# Patient Record
Sex: FEMALE | Race: WHITE
[De-identification: names, ages, dates, MRNs, and addresses within clinical notes are randomized per-mention and may not be internally consistent; named-entity substitution may affect disease eponyms.]

---

## 2019-08-04 ENCOUNTER — HOSPITAL ENCOUNTER (INPATIENT)
Dept: HOSPITAL 61 - 5 NORTH | Age: 44
LOS: 1 days | Discharge: HOME | DRG: 661 | End: 2019-08-05
Payer: COMMERCIAL

## 2019-08-04 ENCOUNTER — HOSPITAL ENCOUNTER (EMERGENCY)
Dept: HOSPITAL 63 - ER | Age: 44
Discharge: ANOTHER HOSPITAL | End: 2019-08-04
Payer: COMMERCIAL

## 2019-08-04 VITALS — SYSTOLIC BLOOD PRESSURE: 105 MMHG | DIASTOLIC BLOOD PRESSURE: 64 MMHG

## 2019-08-04 VITALS — SYSTOLIC BLOOD PRESSURE: 116 MMHG | DIASTOLIC BLOOD PRESSURE: 67 MMHG

## 2019-08-04 VITALS
SYSTOLIC BLOOD PRESSURE: 112 MMHG | DIASTOLIC BLOOD PRESSURE: 59 MMHG | DIASTOLIC BLOOD PRESSURE: 59 MMHG | SYSTOLIC BLOOD PRESSURE: 112 MMHG

## 2019-08-04 VITALS — DIASTOLIC BLOOD PRESSURE: 51 MMHG | SYSTOLIC BLOOD PRESSURE: 93 MMHG

## 2019-08-04 VITALS — SYSTOLIC BLOOD PRESSURE: 112 MMHG | DIASTOLIC BLOOD PRESSURE: 59 MMHG

## 2019-08-04 VITALS — WEIGHT: 166.01 LBS | HEIGHT: 65 IN | BODY MASS INDEX: 27.66 KG/M2

## 2019-08-04 VITALS — BODY MASS INDEX: 29.21 KG/M2 | HEIGHT: 65 IN | WEIGHT: 175.31 LBS

## 2019-08-04 VITALS — DIASTOLIC BLOOD PRESSURE: 64 MMHG | SYSTOLIC BLOOD PRESSURE: 112 MMHG

## 2019-08-04 VITALS — DIASTOLIC BLOOD PRESSURE: 60 MMHG | SYSTOLIC BLOOD PRESSURE: 104 MMHG

## 2019-08-04 VITALS — DIASTOLIC BLOOD PRESSURE: 56 MMHG | SYSTOLIC BLOOD PRESSURE: 106 MMHG

## 2019-08-04 VITALS — SYSTOLIC BLOOD PRESSURE: 106 MMHG | DIASTOLIC BLOOD PRESSURE: 75 MMHG

## 2019-08-04 VITALS — SYSTOLIC BLOOD PRESSURE: 115 MMHG | DIASTOLIC BLOOD PRESSURE: 67 MMHG

## 2019-08-04 DIAGNOSIS — Z87.442: ICD-10-CM

## 2019-08-04 DIAGNOSIS — Z88.5: ICD-10-CM

## 2019-08-04 DIAGNOSIS — N13.2: Primary | ICD-10-CM

## 2019-08-04 DIAGNOSIS — K59.09: ICD-10-CM

## 2019-08-04 DIAGNOSIS — R79.89: ICD-10-CM

## 2019-08-04 DIAGNOSIS — N13.6: Primary | ICD-10-CM

## 2019-08-04 LAB
ALBUMIN SERPL-MCNC: 3.6 G/DL (ref 3.4–5)
ALP SERPL-CCNC: 92 U/L (ref 46–116)
ALT SERPL-CCNC: 24 U/L (ref 14–59)
AMPHETAMINE/METHAMPHETAMINE: (no result)
ANION GAP SERPL CALC-SCNC: 11 MMOL/L (ref 6–14)
APTT PPP: YELLOW S
AST SERPL-CCNC: 19 U/L (ref 15–37)
BACTERIA #/AREA URNS HPF: (no result) /HPF
BARBITURATES UR-MCNC: (no result) UG/ML
BASOPHILS # BLD AUTO: 0 X10^3/UL (ref 0–0.2)
BASOPHILS NFR BLD: 1 % (ref 0–3)
BENZODIAZ UR-MCNC: (no result) UG/L
BILIRUB DIRECT SERPL-MCNC: 0.1 MG/DL (ref 0–0.2)
BILIRUB SERPL-MCNC: 0.3 MG/DL (ref 0.2–1)
BILIRUB UR QL STRIP: (no result)
CA-I SERPL ISE-MCNC: 13 MG/DL (ref 7–20)
CALCIUM SERPL-MCNC: 9.1 MG/DL (ref 8.5–10.1)
CANNABINOIDS UR-MCNC: (no result) UG/L
CHLORIDE SERPL-SCNC: 104 MMOL/L (ref 98–107)
CO2 SERPL-SCNC: 24 MMOL/L (ref 21–32)
COCAINE UR-MCNC: (no result) NG/ML
CREAT SERPL-MCNC: 1.2 MG/DL (ref 0.6–1)
EOSINOPHIL NFR BLD: 0.2 X10^3/UL (ref 0–0.7)
EOSINOPHIL NFR BLD: 3 % (ref 0–3)
ERYTHROCYTE [DISTWIDTH] IN BLOOD BY AUTOMATED COUNT: 13 % (ref 11.5–14.5)
FIBRINOGEN PPP-MCNC: (no result) MG/DL
GFR SERPLBLD BASED ON 1.73 SQ M-ARVRAT: 48.8 ML/MIN
GLUCOSE SERPL-MCNC: 119 MG/DL (ref 70–99)
GLUCOSE UR STRIP-MCNC: (no result) MG/DL
HCT VFR BLD CALC: 38.6 % (ref 36–47)
HGB BLD-MCNC: 12.8 G/DL (ref 12–15.5)
HYALINE CASTS #/AREA URNS LPF: (no result) /HPF
LIPASE: 228 U/L (ref 73–393)
LYMPHOCYTES # BLD: 1.2 X10^3/UL (ref 1–4.8)
LYMPHOCYTES NFR BLD AUTO: 22 % (ref 24–48)
MCH RBC QN AUTO: 30 PG (ref 25–35)
MCHC RBC AUTO-ENTMCNC: 33 G/DL (ref 31–37)
MCV RBC AUTO: 89 FL (ref 79–100)
METHADONE SERPL-MCNC: (no result) NG/ML
MONO #: 0.4 X10^3/UL (ref 0–1.1)
MONOCYTES NFR BLD: 8 % (ref 0–9)
NEUT #: 3.5 X10^3UL (ref 1.8–7.7)
NEUTROPHILS NFR BLD AUTO: 66 % (ref 31–73)
NITRITE UR QL STRIP: (no result)
OPIATES UR-MCNC: (no result) NG/ML
PCP SERPL-MCNC: (no result) MG/DL
PLATELET # BLD AUTO: 199 X10^3/UL (ref 140–400)
POTASSIUM SERPL-SCNC: 3.9 MMOL/L (ref 3.5–5.1)
PREG TEST PT QUAL: NEGATIVE
PROT SERPL-MCNC: 6.7 G/DL (ref 6.4–8.2)
RBC # BLD AUTO: 4.32 X10^6/UL (ref 3.5–5.4)
RBC #/AREA URNS HPF: >40 /HPF (ref 0–2)
SODIUM SERPL-SCNC: 139 MMOL/L (ref 136–145)
SP GR UR STRIP: >=1.03
SQUAMOUS #/AREA URNS LPF: (no result) /LPF
UROBILINOGEN UR-MCNC: 0.2 MG/DL
WBC # BLD AUTO: 5.3 X10^3/UL (ref 4–11)
WBC #/AREA URNS HPF: (no result) /HPF (ref 0–4)

## 2019-08-04 PROCEDURE — 84703 CHORIONIC GONADOTROPIN ASSAY: CPT

## 2019-08-04 PROCEDURE — 84702 CHORIONIC GONADOTROPIN TEST: CPT

## 2019-08-04 PROCEDURE — 85610 PROTHROMBIN TIME: CPT

## 2019-08-04 PROCEDURE — 80048 BASIC METABOLIC PNL TOTAL CA: CPT

## 2019-08-04 PROCEDURE — 80053 COMPREHEN METABOLIC PANEL: CPT

## 2019-08-04 PROCEDURE — 96365 THER/PROPH/DIAG IV INF INIT: CPT

## 2019-08-04 PROCEDURE — 36415 COLL VENOUS BLD VENIPUNCTURE: CPT

## 2019-08-04 PROCEDURE — 85025 COMPLETE CBC W/AUTO DIFF WBC: CPT

## 2019-08-04 PROCEDURE — 80307 DRUG TEST PRSMV CHEM ANLYZR: CPT

## 2019-08-04 PROCEDURE — 96372 THER/PROPH/DIAG INJ SC/IM: CPT

## 2019-08-04 PROCEDURE — BT1F1ZZ FLUOROSCOPY OF LEFT KIDNEY, URETER AND BLADDER USING LOW OSMOLAR CONTRAST: ICD-10-PCS | Performed by: UROLOGY

## 2019-08-04 PROCEDURE — 81001 URINALYSIS AUTO W/SCOPE: CPT

## 2019-08-04 PROCEDURE — 76000 FLUOROSCOPY <1 HR PHYS/QHP: CPT

## 2019-08-04 PROCEDURE — 85730 THROMBOPLASTIN TIME PARTIAL: CPT

## 2019-08-04 PROCEDURE — 99285 EMERGENCY DEPT VISIT HI MDM: CPT

## 2019-08-04 PROCEDURE — 0T778DZ DILATION OF LEFT URETER WITH INTRALUMINAL DEVICE, VIA NATURAL OR ARTIFICIAL OPENING ENDOSCOPIC: ICD-10-PCS | Performed by: UROLOGY

## 2019-08-04 PROCEDURE — A7015 AEROSOL MASK USED W NEBULIZE: HCPCS

## 2019-08-04 PROCEDURE — 83690 ASSAY OF LIPASE: CPT

## 2019-08-04 PROCEDURE — C1769 GUIDE WIRE: HCPCS

## 2019-08-04 PROCEDURE — 74176 CT ABD & PELVIS W/O CONTRAST: CPT

## 2019-08-04 PROCEDURE — 96375 TX/PRO/DX INJ NEW DRUG ADDON: CPT

## 2019-08-04 PROCEDURE — 74022 RADEX COMPL AQT ABD SERIES: CPT

## 2019-08-04 PROCEDURE — 80076 HEPATIC FUNCTION PANEL: CPT

## 2019-08-04 RX ADMIN — TAMSULOSIN HYDROCHLORIDE SCH MG: 0.4 CAPSULE ORAL at 10:00

## 2019-08-04 RX ADMIN — TAMSULOSIN HYDROCHLORIDE SCH MG: 0.4 CAPSULE ORAL at 09:53

## 2019-08-04 RX ADMIN — SULFAMETHOXAZOLE AND TRIMETHOPRIM SCH TAB: 800; 160 TABLET ORAL at 15:01

## 2019-08-04 RX ADMIN — SULFAMETHOXAZOLE AND TRIMETHOPRIM SCH TAB: 800; 160 TABLET ORAL at 23:08

## 2019-08-04 RX ADMIN — BACITRACIN SCH MLS/HR: 5000 INJECTION, POWDER, FOR SOLUTION INTRAMUSCULAR at 09:54

## 2019-08-04 RX ADMIN — BACITRACIN SCH MLS/HR: 5000 INJECTION, POWDER, FOR SOLUTION INTRAMUSCULAR at 18:19

## 2019-08-04 NOTE — OP
DATE OF SURGERY:  08/04/2019



SURGEON:  Maria Ines Walker MD



ASSISTANT:  None.



PREOPERATIVE DIAGNOSIS:  Left ureter stone.



POSTOPERATIVE DIAGNOSIS:  Left ureter stone.



PROCEDURE PERFORMED:  Cystoscopy with left ureteral stent placement and left

retrograde pyelogram.



ANESTHESIA TYPE:  General.



DESCRIPTION OF PROCEDURE:  This is a 43-year-old female admitted for a 6 mm

proximal left ureter stone causing obstruction, as well as likely urinary tract

infection.  After discussion of risks, benefits and alternatives, she agreed to

the above procedure.  



Informed consent was obtained.  The patient was taken to the operating room

where general anesthesia was induced.  She was placed in dorsal lithotomy

position, sterilely prepped and draped.  A timeout was performed.  A rigid

cystoscope was advanced through the urethra and into the bladder.  Within the

bladder was cloudy urine, which was drained.  The ureteral catheter was then

placed into the left ureteral orifice and a gentle left retrograde pyelogram was

performed, moderate left hydronephrosis was noted, and the stone in the proximal

left ureter could also be visualized on fluoroscopy.  A guidewire was passed

into the left ureter.  A 6 x 24 cm stent was then advanced over the wire and

appropriate curl was noted in the renal pelvis and in the bladder.  The bladder

contents were emptied.  The patient was awakened and taken to the recovery room

in stable condition.



BLOOD LOSS:  None.



COMPLICATIONS:  None.



SPECIMEN:  None.

 



______________________________

MARIA INES WALKER MD



DR:  ALICE/nts  JOB#:  [id number] / [id number]

DD:  08/04/2019 13:30  DT:  08/04/2019 16:30

## 2019-08-04 NOTE — PDOC2
UROLOGY CONSULT


Date of Consult


Date of Consult


DATE: 8/4/19 


TIME: 09:57





Reason for Consult


Reason for Consult:


left ureter stone





Identification/Chief Complaint


Chief Complaint


left flank pain





Source


Source:  Chart review, Patient





History of Present Illness


Reason for Visit:


42 yo female presented to [facility] ER earlier today, reporting severe left 

flank pain that radiates to LLQ. Pain was associated with nausea, emesis. No 

fevers, chills.  CT obtained, I reviewed the images, which shows 6 mm left 

proximal ureter stone adjacent to L3. The stone is visible on CT  image. 

Creatinine 1.2. She was then transferred to [facility]. Currently

still having mild nausea, pain has improved. She had a previous kidney stone 

approx 17 years ago. She reports urinary frequency, no dysuria. UA with 

bacteria.


She returned from [place] last week. 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





Past Medical History


GI:  Constipation





Family History


Family History:  No Significant





Social History


No


ALCOHOL:  none


Drugs:  None





Current Medications


Current Medications





Current Medications


Morphine Sulfate (Morphine Sulfate) 4 mg PRN Q4HRS  PRN IV PAIN;  Start 8/4/19 

at 09:30


Ondansetron HCl (Zofran) 4 mg PRN Q4HRS  PRN IV NAUSEA/VOMITING;  Start 8/4/19 

at 09:30


Sodium Chloride 1,000 ml @  100 mls/hr Q10H IV  Last administered on 8/4/19at 

09:54;  Start 8/4/19 at 09:30


Tamsulosin HCl (Flomax) 0.4 mg DAILY PO  Last administered on 8/4/19at 09:53;  

Start 8/4/19 at 10:00





Allergies


Allergies:  


Coded Allergies:  


     hydrocodone (Verified  Allergy, Intermediate, Rash, 8/4/19)


   


   and dizziness,tolerates iv morphine





ROS


Review Of Systems:


Except as noted in HPI:


CONSTITUTIONAL:        No fever or chills


EYES:                          No recent changes


SKIN:               No rash or itching


CARDIOVASCULAR:     No chest pain, syncope, palpitations, or edema


RESPIRATORY:            No SOB or cough


GASTROINTESTINAL:    + nausea, vomiting


NEUROLOGICAL:          No headaches or weakness


ENDOCRINE:               No cold or heat intolerance


GENITOURINARY:         + frequency of urination


MUSCULOSKELETAL:   No back pain or joint pain


LYMPHATICS:               No enlarged lymph nodes


PSYCHIATRIC:              No anxiety or depression





Physical Exam


Physical Exam:


General: Pleasant, no acute distress, well groomed


Eyes: conjunctiva anicteric, eyes full range of motion


ENT: moist oral mucosa, normal dentition


Neck: Trachea midline, no masses


Respiratory: unlabored breathing, not using accessory muscles, no crackles or 

wheezes


Cardiovascular: Regular rate and rhythm, no peripheral edema


Abdomen: nontender, nondistended, no hepatosplenomegaly, no masses


Back: no CVA tenderness


Skin: no rashes or skin lesions on visualized skin


Psych: normal mood, affect. Alert and oriented x 3.





Vitals


VITALS





Vital Signs








  Date Time  Temp Pulse Resp B/P (MAP) Pulse Ox O2 Delivery O2 Flow Rate FiO2


 


8/4/19 09:39      Room Air  











Assessment/Plan


Assessment/Plan


Left proximal ureter with possible UTI. Discussed option of pain management + 

UTI treatment, then stone treatment after UTI treated. Also discussed option of 

ureteral stent placement today, then definitive stone management as outpatient 

([facility]). Risks of procedure discussed including bleeding, infection, pain, need 

for additional procedures, anesthesia risk.


She wants to proceed with ureteral stent placement - wants to try to get to work

tomorrow. Should be ok for her to discharge later today after the procedure.





Start Bactrim DS BID for UTI, continue for 7 days total.











MARIA INES WALKER MD                Aug 4, 2019 09:58

## 2019-08-04 NOTE — RAD
Acute Abdominal Series: 8/4/2019 2:43 AM

 

Reason for study: Left flank pain.

 

Comparison studies: CT abdomen/pelvis August 4, 2019.

 

Technique: Frontal view of the chest was obtained along with supine and 

upright views of the abdomen.

 

Findings: 

 

Nonobstructive bowel gas pattern. No air fluid levels or free air. 

 

The lungs are clear without acute consolidative opacity. No pleural 

effusion or pneumothorax. The cardiac and mediastinal contours are normal.

 

Suspect a left ureteral calculus measuring 4 mm at the level of the left 

L3 transverse process.

 

Visualized osseous structures are intact.

 

IMPRESSION:

 

1. 4 mm calculus is identified along the proximal left ureter at the level

of the left L3 transverse process. Findings likely correspond with 

calculus identified on CT.

2. No acute cardiopulmonary process. Nonobstructive bowel gas pattern.

 

Electronically signed by: Samaria Collazo MD (8/4/2019 7:56 AM) 

[facility]

## 2019-08-04 NOTE — ED.ADGEN
Past History


Past Medical History:  Constipation, Kidney Stones


Past Surgical History:  Other


Past Surgical History


Bladder Lift





Adult General


Chief Complaint


Chief Complaint


".. I ve been hurting really bad tonight.. here on the Lt.. It was in my back...

but now more low towards the front... It somewhat like a kidney stone pain.. I 

ve had the twice before... but it been a long time ago... I feel constipated.. 

but I ve had a stool.. .. I  may be dehydrated.. I just got back from [facility]..and the [facility].... my father wanted to take some pictures of 

birds..."





HPI


HPI





Patient is a 43 year old female who presents with above hx and complaints low 

back pain on Lt. flank.  Patient states pain is somewhat similar to prior kidney

stones. Patient has had 2 prior kidney stones. Patient does feel she dehydrated 

when she was in [place]. Pt. denies any intake of bad food..Pt was in

[facility] and [facility] during her stay .   Patient denies any dysuria. Patient 

is complaining of severe nausea. No history of trauma. No history 

immunosuppression. No specific ill contacts. Has had a normal stool.   Pain has 

been intermittent until this morning.  Patient normally follows with Dr. Solis





Review of Systems


Review of Systems





Constitutional: Denies fever or chills []


Eyes: Denies change in visual acuity, redness, or eye pain []


HENT: Denies nasal congestion or sore throat []


Respiratory: Denies cough or shortness of breath []


Cardiovascular: No additional information not addressed in HPI []


GI: Complaints of left flank abdominal pain, nausea. Denies, vomiting, bloody 

stools or diarrhea []


: Denies dysuria or hematuria []


Musculoskeletal: Left flank back pain. No midline tenderness


Integument: Denies rash or skin lesions []


Neurologic: Denies headache, focal weakness or sensory changes []


Endocrine: Denies polyuria or polydipsia []





All other systems were reviewed and found to be within normal limits, except as 

documented in this note.





Family History


Family History


Noncontributory





Current Medications


Current Medications





Current Medications








 Medications


  (Trade)  Dose


 Ordered  Sig/Damian  Start Time


 Stop Time Status Last Admin


Dose Admin


 


 Diphenhydramine


 HCl


  (Benadryl)  25 mg  1X  ONCE  19 07:15


 19 07:16 DC 19 07:26


25 MG


 


 Famotidine


  (Pepcid Vial)  20 mg  1X  ONCE  19 03:00


 19 03:01 DC 19 03:16


20 MG


 


 Ketorolac


 Tromethamine


  (Toradol 30mg


 Vial)  30 mg  1X  ONCE  19 05:00


 19 05:01 DC 19 05:39


30 MG


 


 Lactated Ringer's  1,000 ml @ 


 1,000 mls/hr  1X  ONCE  19 04:30


 19 05:29 DC 19 05:11


1,000 MLS/HR


 


 Levofloxacin


  (Levaquin)  500 mg  STK-MED ONCE  19 06:47


 19 06:48 DC  





 


 Levofloxacin/


 Dextrose  100 ml @ 


 100 mls/hr  1X  ONCE  19 07:30


 19 07:59 DC 19 07:25


100 MLS/HR


 


 Morphine Sulfate


  (Morphine 10mg


 Syringe)  10 mg  STK-MED ONCE  19 03:05


 19 03:06 DC  





 


 Morphine Sulfate


  (Morphine 2mg


 Syringe)  2 mg  1X  ONCE  19 03:30


 19 03:45 DC 19 03:28


2 MG


 


 Ondansetron HCl


  (Zofran)  8 mg  1X  ONCE  19 03:00


 19 03:01 DC 19 03:17


8 MG


 


 Prochlorperazine


 Edisylate


  (Compazine)  10 mg  1X  ONCE  19 07:15


 19 07:16 DC 19 07:26


10 MG











Allergies


Allergies





Allergies








Coded Allergies Type Severity Reaction Last Updated Verified


 


  hydrocodone Allergy Severe Rash 19 Yes











Physical Exam


Physical Exam





Constitutional: Well developed, well nourished, in acute distress, non-toxic 

appearance. []


HENT: Normocephalic, atraumatic, bilateral external ears normal, oropharynx 

moist, no oral exudates, nose normal. []


Eyes: PERRLA, EOMI, conjunctiva normal, no discharge. [] 


Neck: Normal range of motion, no tenderness, supple, no stridor. [] 


Cardiovascular: Tachycardia Heart rate regular rhythm, no murmur []


Lungs & Thorax:  Bilateral breath sounds equal at apexes on to auscultation []


Abdomen: Bowel sounds decreased, soft, left flank tenderness, no masses, no 

pulsatile masses. [] Pain radiates into the groin area on percussion on the 

left.


Skin: Warm,  diaphoretic, no erythema, no rash. [] 


Back: No tenderness, left CVA tenderness. [] 


Extremities: No tenderness, no cyanosis, no clubbing, ROM intact, no edema. [] 

Straight leg lift negative


Neurologic: Alert and oriented X 3, normal motor function, normal sensory 

function, no focal deficits noted. []


Psychologic: Affect anxious, judgement normal, mood normal. Appears  intelligent

 and understands medical issues.





Current Patient Data


Vital Signs





                                   Vital Signs








  Date Time  Temp Pulse Resp B/P (MAP) Pulse Ox O2 Delivery O2 Flow Rate FiO2


 


19 07:00  66 20 112/59 (76) 96 Room Air  


 


19 03:02 98.0       








Lab Results





                                Laboratory Tests








Test


 19


02:51 19


06:30


 


White Blood Count


 5.3 x10^3/uL


(4.0-11.0) 





 


Red Blood Count


 4.32 x10^6/uL


(3.50-5.40) 





 


Hemoglobin


 12.8 g/dL


(12.0-15.5) 





 


Hematocrit


 38.6 %


(36.0-47.0) 





 


Mean Corpuscular Volume


 89 fL ()


 





 


Mean Corpuscular Hemoglobin 30 pg (25-35)   


 


Mean Corpuscular Hemoglobin


Concent 33 g/dL


(31-37) 





 


Red Cell Distribution Width


 13.0 %


(11.5-14.5) 





 


Platelet Count


 199 x10^3/uL


(140-400) 





 


Neutrophils (%) (Auto) 66 % (31-73)   


 


Lymphocytes (%) (Auto) 22 % (24-48)  L 


 


Monocytes (%) (Auto) 8 % (0-9)   


 


Eosinophils (%) (Auto) 3 % (0-3)   


 


Basophils (%) (Auto) 1 % (0-3)   


 


Neutrophils # (Auto)


 3.5 x10^3uL


(1.8-7.7) 





 


Lymphocytes # (Auto)


 1.2 x10^3/uL


(1.0-4.8) 





 


Monocytes # (Auto)


 0.4 x10^3/uL


(0.0-1.1) 





 


Eosinophils # (Auto)


 0.2 x10^3/uL


(0.0-0.7) 





 


Basophils # (Auto)


 0.0 x10^3/uL


(0.0-0.2) 





 


Prothrombin Time


 9.4 SEC


(9.4-11.4) 





 


Prothrombin Time INR 0.9 (0.9-1.1)   


 


PTT


 23 SEC (23-33)


 





 


Maternal Serum HCG Beta


Subunit < 1 mIU/mL


(0-6) 





 


Sodium Level


 139 mmol/L


(136-145) 





 


Potassium Level


 3.9 mmol/L


(3.5-5.1) 





 


Chloride Level


 104 mmol/L


() 





 


Carbon Dioxide Level


 24 mmol/L


(21-32) 





 


Anion Gap 11 (6-14)   


 


Blood Urea Nitrogen


 13 mg/dL


(7-20) 





 


Creatinine


 1.2 mg/dL


(0.6-1.0)  H 





 


Estimated GFR


(Cockcroft-Gault) 48.8  


 





 


Glucose Level


 119 mg/dL


(70-99)  H 





 


Calcium Level


 9.1 mg/dL


(8.5-10.1) 





 


Total Bilirubin


 0.3 mg/dL


(0.2-1.0) 





 


Direct Bilirubin


 0.1 mg/dL


(0.0-0.2) 





 


Aspartate Amino Transferase


(AST) 19 U/L (15-37)


 





 


Alanine Aminotransferase (ALT)


 24 U/L (14-59)


 





 


Alkaline Phosphatase


 92 U/L


() 





 


Total Protein


 6.7 g/dL


(6.4-8.2) 





 


Albumin


 3.6 g/dL


(3.4-5.0) 





 


Lipase


 228 U/L


() 





 


Serum Pregnancy Test,


Qualitative Negative (NEG)


 





 


Urine Collection Type  Void  


 


Urine Color  Yellow  


 


Urine Clarity  Hazy  


 


Urine pH  5.0  


 


Urine Specific Gravity  >=1.030  


 


Urine Protein


 


 Trace


(NEG-TRACE)


 


Urine Glucose (UA)


 


 Neg mg/dL


(NEG)


 


Urine Ketones (Stick)


 


 Neg mg/dL


(NEG)


 


Urine Blood  Large (NEG)  


 


Urine Nitrite  Neg (NEG)  


 


Urine Bilirubin  Neg (NEG)  


 


Urine Urobilinogen Dipstick


 


 0.2 mg/dL (0.2


mg/dL)


 


Urine Leukocyte Esterase  Neg (NEG)  


 


Urine RBC


 


 >40 /HPF (0-2)





 


Urine WBC


 


 1-4 /HPF (0-4)





 


Urine Squamous Epithelial


Cells 


 Few /LPF  





 


Urine Bacteria


 


 Few /HPF


(0-FEW)


 


Urine Hyaline Casts  Few /HPF  


 


Urine Mucus  Mod /LPF  


 


Urine Opiates Screen  Pos (NEG)  


 


Urine Methadone Screen  Neg (NEG)  


 


Urine Barbiturates  Neg (NEG)  


 


Urine Phencyclidine Screen  Neg (NEG)  


 


Urine


Amphetamine/Methamphetamine 


 Neg (NEG)  





 


Urine Benzodiazepines Screen  Neg (NEG)  


 


Urine Cocaine Screen  Neg (NEG)  


 


Urine Cannabinoids Screen  Neg (NEG)  


 


Urine Ethyl Alcohol  Neg (NEG)  











EKG


EKG


[]





Radiology/Procedures


Radiology/Procedures


[][facility] [address], [place], [place] [address] [phone]


                                        


                                 IMAGING REPORT





                                     Signed





PATIENT: MATT PRATHER   ACCOUNT: [id number]     MRN#: [id number]


: 1975           LOCATION: ER              AGE: 43


SEX: F                    EXAM DT: 19         ACCESSION#: [id number]


STATUS: REG ER            ORD. PHYSICIAN: KELLI GARCIA MD   


REASON: Left flank pain, hx utereus suspension, kidney stones


PROCEDURE: CT ABDOMEN PELVIS WO CONTRAST





INDICATION: Left flank pain


 


COMPARISON: None.


 


TECHNIQUE:


 


Axial CT images obtained through the abdomen and pelvis without contrast. 


Limited assessment of solid organ structures and vasculature secondary to 


lack of intravenous contrast..


 


One or more of the following individualized dose reduction techniques were


utilized for this examination:  1. Automated exposure control;  2. 


Adjustment of the mA and/or kV according to patient size;  3. Use of 


iterative reconstruction technique.


 


FINDINGS:


 


Abdominal aorta is not aneurysmal.


No intrahepatic bile duct dilation.


 Limited evaluation of the pancreas secondary to lack of contrast.


Spleen unremarkable.


Left-sided hydronephrosis with perinephric edema and proximal hydroureter.


5-6 mm left proximal ureter stone.


Urinary bladder is partially distended.


Nonobstructive right renal stone without hydronephrosis. Suspected 


low-density lesion along the right renal cortex which may be cystic in 


nature but limited evaluation on noncontrast imaging.


No periappendiceal inflammatory changes.


No dilated loops of bowel to suggest obstruction.


Degenerative changes the spine.


Heterogeneity within the uterus with some low-density lesion suspected.


Degenerative changes the spine with some disc protrusions including at 


L5-S1 and L4-5.


 


IMPRESSION:


 


1.  Left-sided hydronephrosis and hydroureter with a proximal ureter 


stone.


 


2.  Suspected low-density lesions within the uterus. Most common cause 


would be fibroids.


 


3.  Degenerative changes the spine with some disc protrusions identified.


 


Electronically signed by: Shaq Prather MD (2019 5:51 AM) [facility]














DICTATED AND SIGNED BY:     SHAQ PRATHER MD


DATE:     19 0551





CC: MILAN SOLIS MD; KELLI GARCIA MD ~





Course & Med Decision Making


Course & Med Decision Making


Pertinent Labs and Imaging studies reviewed. (See chart for details)





Discussed presentation, testing and treatment plan with  and Pt.  in for 

transfer to [facility] for further evaluation and treatment and 

urology consult





[]





Final Impression


Final Impression


1. Renal Colic - Proximal Lt stone with hydronephrosis


2. Mild elevation in creatinine 1.2





Dragon Disclaimer


Dragon Disclaimer


This electronic medical record was generated, in whole or in part, using a voice

 recognition dictation system.





Discharge Summary


Visit Information


Final Diagnosis


Problems


Medical Problems:


(1) Flank pain


Status: Acute  





(2) Hydronephrosis


Status: Acute  





(3) Renal colic on left side


Status: Acute  











Brief Hospital Course


Allergies





                                    Allergies








Coded Allergies Type Severity Reaction Last Updated Verified


 


  hydrocodone Allergy Severe Rash 19 Yes








Vital Signs





Vital Signs








  Date Time  Temp Pulse Resp B/P (MAP) Pulse Ox O2 Delivery O2 Flow Rate FiO2


 


19 07:00  66 20 112/59 (76) 96 Room Air  


 


19 03:02 98.0       








Lab Results





Laboratory Tests








Test


 19


02:51 19


06:30


 


White Blood Count


 5.3 x10^3/uL


(4.0-11.0) 





 


Red Blood Count


 4.32 x10^6/uL


(3.50-5.40) 





 


Hemoglobin


 12.8 g/dL


(12.0-15.5) 





 


Hematocrit


 38.6 %


(36.0-47.0) 





 


Mean Corpuscular Volume 89 fL ()  


 


Mean Corpuscular Hemoglobin 30 pg (25-35)  


 


Mean Corpuscular Hemoglobin


Concent 33 g/dL


(31-37) 





 


Red Cell Distribution Width


 13.0 %


(11.5-14.5) 





 


Platelet Count


 199 x10^3/uL


(140-400) 





 


Neutrophils (%) (Auto) 66 % (31-73)  


 


Lymphocytes (%) (Auto) 22 % (24-48)  


 


Monocytes (%) (Auto) 8 % (0-9)  


 


Eosinophils (%) (Auto) 3 % (0-3)  


 


Basophils (%) (Auto) 1 % (0-3)  


 


Neutrophils # (Auto)


 3.5 x10^3uL


(1.8-7.7) 





 


Lymphocytes # (Auto)


 1.2 x10^3/uL


(1.0-4.8) 





 


Monocytes # (Auto)


 0.4 x10^3/uL


(0.0-1.1) 





 


Eosinophils # (Auto)


 0.2 x10^3/uL


(0.0-0.7) 





 


Basophils # (Auto)


 0.0 x10^3/uL


(0.0-0.2) 





 


Prothrombin Time


 9.4 SEC


(9.4-11.4) 





 


Prothromb Time International


Ratio 0.9 (0.9-1.1) 


 





 


Activated Partial


Thromboplast Time 23 SEC (23-33) 


 





 


Maternal Serum HCG Beta


Subunit < 1 mIU/mL


(0-6) 





 


Sodium Level


 139 mmol/L


(136-145) 





 


Potassium Level


 3.9 mmol/L


(3.5-5.1) 





 


Chloride Level


 104 mmol/L


() 





 


Carbon Dioxide Level


 24 mmol/L


(21-32) 





 


Anion Gap 11 (6-14)  


 


Blood Urea Nitrogen


 13 mg/dL


(7-20) 





 


Creatinine


 1.2 mg/dL


(0.6-1.0) 





 


Estimated GFR


(Cockcroft-Gault) 48.8 


 





 


Glucose Level


 119 mg/dL


(70-99) 





 


Calcium Level


 9.1 mg/dL


(8.5-10.1) 





 


Total Bilirubin


 0.3 mg/dL


(0.2-1.0) 





 


Direct Bilirubin


 0.1 mg/dL


(0.0-0.2) 





 


Aspartate Amino Transf


(AST/SGOT) 19 U/L (15-37) 


 





 


Alanine Aminotransferase


(ALT/SGPT) 24 U/L (14-59) 


 





 


Alkaline Phosphatase


 92 U/L


() 





 


Total Protein


 6.7 g/dL


(6.4-8.2) 





 


Albumin


 3.6 g/dL


(3.4-5.0) 





 


Lipase


 228 U/L


() 





 


Serum Pregnancy Test,


Qualitative Negative (NEG) 


 





 


Urine Collection Type  Void 


 


Urine Color  Yellow 


 


Urine Clarity  Hazy 


 


Urine pH  5.0 


 


Urine Specific Gravity  >=1.030 


 


Urine Protein


 


 Trace


(NEG-TRACE)


 


Urine Glucose (UA)


 


 Neg mg/dL


(NEG)


 


Urine Ketones (Stick)


 


 Neg mg/dL


(NEG)


 


Urine Blood  Large (NEG) 


 


Urine Nitrite  Neg (NEG) 


 


Urine Bilirubin  Neg (NEG) 


 


Urine Urobilinogen Dipstick


 


 0.2 mg/dL (0.2


mg/dL)


 


Urine Leukocyte Esterase  Neg (NEG) 


 


Urine RBC  >40 /HPF (0-2) 


 


Urine WBC  1-4 /HPF (0-4) 


 


Urine Squamous Epithelial


Cells 


 Few /LPF 





 


Urine Bacteria


 


 Few /HPF


(0-FEW)


 


Urine Hyaline Casts  Few /HPF 


 


Urine Mucus  Mod /LPF 


 


Urine Opiates Screen  Pos (NEG) 


 


Urine Methadone Screen  Neg (NEG) 


 


Urine Barbiturates  Neg (NEG) 


 


Urine Phencyclidine Screen  Neg (NEG) 


 


Urine


Amphetamine/Methamphetamine 


 Neg (NEG) 





 


Urine Benzodiazepines Screen  Neg (NEG) 


 


Urine Cocaine Screen  Neg (NEG) 


 


Urine Cannabinoids Screen  Neg (NEG) 


 


Urine Ethyl Alcohol  Neg (NEG) 








Brief Hospital Course


Ms. Prather  is a 43 old female who presented with Lt renal colic, stone and 

hydronephrosis.  Transfer to [facility] Dr. Guzman and urology consult.





Discharge Information


Condition at Discharge:  Stable


Disposition/Orders:  D/C to Another Facility


Dischare Medications





Current Medications


Lactated Ringer's 1,000 ml @  1,000 mls/hr Q1H IV  Last administered on 19at

 02:56; Admin Dose 1,000 MLS/HR;  Start 19 at 02:43;  Stop 19 at 03:42; 

 Status DC


Ondansetron HCl (Zofran) 8 mg 1X  ONCE IV  Last administered on 19at 03:17; 

Admin Dose 8 MG;  Start 19 at 03:00;  Stop 19 at 03:01;  Status DC


Famotidine (Pepcid Vial) 20 mg 1X  ONCE IVP  Last administered on 19at 

03:16; Admin Dose 20 MG;  Start 19 at 03:00;  Stop 19 at 03:01;  Status 

DC


Morphine Sulfate (Morphine 10mg Syringe) 10 mg 1X  ONCE SQ  Last administered on

 19at 03:16; Admin Dose 10 MG;  Start 19 at 03:00;  Stop 19 at 

03:05;  Status DC


Morphine Sulfate (Morphine 10mg Syringe) 10 mg STK-MED ONCE .ROUTE ;  Start 

19 at 03:05;  Stop 19 at 03:06;  Status DC


Morphine Sulfate (Morphine 2mg Syringe) 2 mg STK-MED ONCE .ROUTE ;  Start 19

 at 03:18;  Stop 19 at 03:19;  Status DC


Morphine Sulfate (Morphine 2mg Syringe) 2 mg 1X  ONCE IV  Last administered on 

19at 03:28; Admin Dose 2 MG;  Start 19 at 03:30;  Stop 19 at 03:45; 

 Status DC


Lactated Ringer's 1,000 ml @  1,000 mls/hr 1X  ONCE IV  Last administered on 

19at 05:11; Admin Dose 1,000 MLS/HR;  Start 19 at 04:30;  Stop 19 at

 05:29;  Status DC


Ketorolac Tromethamine (Toradol 30mg Vial) 30 mg 1X  ONCE IV  Last administered 

on 19at 05:39; Admin Dose 30 MG;  Start 19 at 05:00;  Stop 19 at 

05:01;  Status DC


Levofloxacin (Levaquin) 500 mg 1X  ONCE PO  Last administered on 19at 06:49;

 Admin Dose 500 MG;  Start 19 at 07:00;  Stop 19 at 07:23;  Status DC


Levofloxacin (Levaquin) 500 mg STK-MED ONCE .ROUTE ;  Start 19 at 06:47;  

Stop 19 at 06:48;  Status DC


Diphenhydramine HCl (Benadryl) 25 mg 1X  ONCE IVP  Last administered on 19at

 07:26; Admin Dose 25 MG;  Start 19 at 07:15;  Stop 19 at 07:16;  Status

 DC


Prochlorperazine Edisylate (Compazine) 10 mg 1X  ONCE IV  Last administered on 

19at 07:26; Admin Dose 10 MG;  Start 19 at 07:15;  Stop 19 at 07:16;

  Status DC


Levofloxacin/ Dextrose 100 ml @  100 mls/hr 1X  ONCE IV  Last administered on 

19at 07:25; Admin Dose 100 MLS/HR;  Start 19 at 07:30;  Stop 19 at 

07:59;  Status DC








Dragon Disclaimer


This chart was dictated in whole or in part using Voice Recognition software in 

a busy, high-work load, and often noisy Emergency Department environment.  It 

may contain unintended and wholly unrecognized errors or omissions.











KELLI GARCIA MD            Aug 4, 2019 02:24

## 2019-08-04 NOTE — PDOC4
OPERATIVE NOTE


Date:


Date:  Aug 4, 2019





Pre-Op Diagnosis:


left ureter stone





Post-Op Diagnosis:


same





Procedure Performed:


cystoscopy, left ureter stent placement, left retrograde pyelogram





Surgeon:


Maria Ines Walker MD





Anesthesia Type:


general





Blood Loss:


0





Specimans Obtained:


none





Findings:


cloudy urine in bladder.


left proximal ureter stone visible on flouroscopy.


moderate left hydronephrosis





Complications:


none





Operative Note:


see dictation.


ok for discharge from urology perspective.


continue Bactrim x 7 days


We will contact patient for followup.











MARIA INES WALKER MD                Aug 4, 2019 13:27

## 2019-08-04 NOTE — RAD
INDICATION: Left flank pain

 

COMPARISON: None.

 

TECHNIQUE:

 

Axial CT images obtained through the abdomen and pelvis without contrast. 

Limited assessment of solid organ structures and vasculature secondary to 

lack of intravenous contrast..

 

One or more of the following individualized dose reduction techniques were

utilized for this examination:  1. Automated exposure control;  2. 

Adjustment of the mA and/or kV according to patient size;  3. Use of 

iterative reconstruction technique.

 

FINDINGS:

 

Abdominal aorta is not aneurysmal.

No intrahepatic bile duct dilation.

 Limited evaluation of the pancreas secondary to lack of contrast.

Spleen unremarkable.

Left-sided hydronephrosis with perinephric edema and proximal hydroureter.

5-6 mm left proximal ureter stone.

Urinary bladder is partially distended.

Nonobstructive right renal stone without hydronephrosis. Suspected 

low-density lesion along the right renal cortex which may be cystic in 

nature but limited evaluation on noncontrast imaging.

No periappendiceal inflammatory changes.

No dilated loops of bowel to suggest obstruction.

Degenerative changes the spine.

Heterogeneity within the uterus with some low-density lesion suspected.

Degenerative changes the spine with some disc protrusions including at 

L5-S1 and L4-5.

 

IMPRESSION:

 

1.  Left-sided hydronephrosis and hydroureter with a proximal ureter 

stone.

 

2.  Suspected low-density lesions within the uterus. Most common cause 

would be fibroids.

 

3.  Degenerative changes the spine with some disc protrusions identified.

 

Electronically signed by: Akash Prather MD (8/4/2019 5:51 AM) [facility]

## 2019-08-05 VITALS — SYSTOLIC BLOOD PRESSURE: 116 MMHG | DIASTOLIC BLOOD PRESSURE: 67 MMHG

## 2019-08-05 VITALS — SYSTOLIC BLOOD PRESSURE: 100 MMHG | DIASTOLIC BLOOD PRESSURE: 56 MMHG

## 2019-08-05 LAB
ALBUMIN SERPL-MCNC: 2.6 G/DL (ref 3.4–5)
ALBUMIN/GLOB SERPL: 0.8 {RATIO} (ref 1–1.7)
ALP SERPL-CCNC: 74 U/L (ref 46–116)
ALT SERPL-CCNC: 18 U/L (ref 14–59)
ANION GAP SERPL CALC-SCNC: 13 MMOL/L (ref 6–14)
AST SERPL-CCNC: 13 U/L (ref 15–37)
BASOPHILS # BLD AUTO: 0 X10^3/UL (ref 0–0.2)
BASOPHILS NFR BLD: 0 % (ref 0–3)
BILIRUB SERPL-MCNC: 0.4 MG/DL (ref 0.2–1)
BUN SERPL-MCNC: 9 MG/DL (ref 7–20)
BUN/CREAT SERPL: 9 (ref 6–20)
CALCIUM SERPL-MCNC: 8.2 MG/DL (ref 8.5–10.1)
CHLORIDE SERPL-SCNC: 109 MMOL/L (ref 98–107)
CO2 SERPL-SCNC: 20 MMOL/L (ref 21–32)
CREAT SERPL-MCNC: 1 MG/DL (ref 0.6–1)
EOSINOPHIL NFR BLD: 0 % (ref 0–3)
EOSINOPHIL NFR BLD: 0 X10^3/UL (ref 0–0.7)
ERYTHROCYTE [DISTWIDTH] IN BLOOD BY AUTOMATED COUNT: 13.2 % (ref 11.5–14.5)
GFR SERPLBLD BASED ON 1.73 SQ M-ARVRAT: 60.2 ML/MIN
GLOBULIN SER-MCNC: 3.2 G/DL (ref 2.2–3.8)
GLUCOSE SERPL-MCNC: 102 MG/DL (ref 70–99)
HCT VFR BLD CALC: 34.4 % (ref 36–47)
HGB BLD-MCNC: 11.7 G/DL (ref 12–15.5)
LYMPHOCYTES # BLD: 0.9 X10^3/UL (ref 1–4.8)
LYMPHOCYTES NFR BLD AUTO: 14 % (ref 24–48)
MCH RBC QN AUTO: 30 PG (ref 25–35)
MCHC RBC AUTO-ENTMCNC: 34 G/DL (ref 31–37)
MCV RBC AUTO: 88 FL (ref 79–100)
MONO #: 0.4 X10^3/UL (ref 0–1.1)
MONOCYTES NFR BLD: 6 % (ref 0–9)
NEUT #: 5.4 X10^3/UL (ref 1.8–7.7)
NEUTROPHILS NFR BLD AUTO: 81 % (ref 31–73)
PLATELET # BLD AUTO: 160 X10^3/UL (ref 140–400)
POTASSIUM SERPL-SCNC: 4.3 MMOL/L (ref 3.5–5.1)
PROT SERPL-MCNC: 5.8 G/DL (ref 6.4–8.2)
RBC # BLD AUTO: 3.89 X10^6/UL (ref 3.5–5.4)
SODIUM SERPL-SCNC: 142 MMOL/L (ref 136–145)
WBC # BLD AUTO: 6.7 X10^3/UL (ref 4–11)

## 2019-08-05 RX ADMIN — BACITRACIN SCH MLS/HR: 5000 INJECTION, POWDER, FOR SOLUTION INTRAMUSCULAR at 04:14

## 2019-08-05 RX ADMIN — TAMSULOSIN HYDROCHLORIDE SCH MG: 0.4 CAPSULE ORAL at 09:00

## 2019-08-05 RX ADMIN — SULFAMETHOXAZOLE AND TRIMETHOPRIM SCH TAB: 800; 160 TABLET ORAL at 09:55

## 2019-08-05 NOTE — HP
ADMIT DATE:  08/04/2019



HISTORY OF PRESENT ILLNESS:  The patient is a 43-year-old  female

patient who presented to the Emergency Room of [facility] complaining

of pain on her left flank and back area that feels like her kidney stones pain

and she apparently had a total of 2 stones before that she passed spontaneously,

it has been a long time ago.  She apparently was on a vacation at [facility] the

[facility].  She apparently has 2 prior kidney stones that she passed

spontaneously.  She does feel that she is hydrated and she was in [place], but denies any intake or bad food.  She denied any dysuria.  She is

complaining of severe nausea, but no history of trauma, no history of

immunosuppression or specific ill contact.  She was evaluated in the Emergency

Room and has had lab work, which was essentially unremarkable.  Her urinalysis

showed that there is large amount of blood and her tox screen was positive for

opiates.  Her CT scan of the abdomen and pelvis showed that she has 4 mm

calculus identified along the proximal left ureter at the level of the left L3

transverse process finding likely corresponds with calculus identified in the CT

scan, no acute cardiopulmonary process and therefore, she was transferred to

[facility] to consult the urologist as she has left-sided

hydronephrosis with perinephric edema and proximal hydroureter 5-6 mm left

proximal ureteral stone.



PAST MEDICAL HISTORY:  Significant for 2 episodes of renal calculi that she

passed atraumatically spontaneously.  She has also had chronic constipation.



PAST SURGICAL HISTORY:  Significant also for constipation.



FAMILY HISTORY:  Unremarkable.



SOCIAL HISTORY:  She is .  She does not smoke, drink alcohol or use any

recreational drugs.  She is a teacher.  She has 3 children, 2 of them have

celiac disease and her sister also has one child with celiac disease.



ALLERGIES:  She is allergic to HYDROCODONE.



PHYSICAL EXAMINATION:

GENERAL:  On examining her, the patient looked well and was clearly in no

apparent respiratory distress.  No pallor, jaundice, cyanosis or thyromegaly. 

No jugular venous distention.  No limb edema.

VITAL SIGNS:  Her heart rate was 72, blood pressure was 93/51, temperature was

97.8, respiratory rate was 16, and oxygen saturation was 99%.

HEAD, EYES, EARS, NOSE, AND THROAT:  Normocephalic, atraumatic.

NECK:  Supple.

HEART:  Showed normal first and second heart sounds with no gallop, rub or

murmur.

CHEST:  Clear to auscultation.  No crepitation or rhonchi.

ABDOMEN:  Distended, soft, nontender.  No guarding or rigidity.  No

organomegaly.  All hernial orifice intact.  Bowel sounds normal.  She has no CVA

tenderness.

SKIN:  Warm.  No evidence of any rashes or lesions.



LABORATORY DATA:  While in the Emergency Room, she has had lab work, which

showed a white cell count 6700, hemoglobin 11.7, hematocrit 34.4, MCV 88 and

platelet count of 160,000.  Her chemistry showed serum sodium of 142, potassium

4.3, chloride 104, bicarbonate 24, anion gap of 11, BUN 13, creatinine 1.2,

estimated GFR was 48 mL per minute.  Her glucose 119, calcium was 9.1.  Total

bilirubin, AST, ALT, alkaline phosphatase were normal.  Total protein was 6.7,

albumin 3.6 and lipase was 128.  Her serum pregnancy test was negative.  White

cell count was 5300, hemoglobin 12.8, hematocrit 38, MCV 89 and platelet count

of 199,000.  Her prothrombin time, INR and aPTT were all normal.  Urinalysis

showed the urine was yellow, hazy with a pH of 5, specific gravity of 1.030. 

There was a trace of protein, negative for glucose and ketones, large amount of

blood, negative for nitrite and bilirubin, negative for leukocyte esterase or

more than 40 rbc's, 1-4 wbc's, very few bacteria.  Tox screen was positive for

opiates.  Her imaging studies showed acute abdomen, series showed that the

patient has 4 mm calculus identified along with the proximal left ureter at the

level of the left L3 transverse process.  No other acute cardiopulmonary

process, nonobstructive bowel gas pattern.  CT scan of the abdomen and pelvis

showed that the abdominal aorta is not aneurysmal in the intrahepatic bile duct

dilatation.  Limited evaluation of the pancreas secondary to lack of contrast. 

Spleen is unremarkable.  Left-sided hydronephrosis with perinephric edema and

proximal hydroureter 5-6 mm left proximal ureteral stone, nonobstructive right

renal stone without hydronephrosis, suspected low density lesion along the right

renal cortex, which may be cystic in nature, but limited evaluation on

noncontrast imaging.



ASSESSMENT AND PLAN:  The patient was transferred to [facility]

and was treated with IV pain medication, IV fluid and to consult the Urology

team.

 



______________________________

ISMA PETERS MD



DR:  DELMER/tori  JOB#:  [id number] / [id number]

DD:  08/05/2019 08:25  DT:  08/05/2019 09:10

## 2019-08-05 NOTE — PDOC
MYNOR MALLORY APRN 8/5/19 0934:


SUBJECTIVE


Subjective


Patient feeling well this am and would like to go home.  No fevers, bad episodes

of pain overnight. Urine is red but resembles koolade or Gatorade.





OBJECTIVE


Objective


Physical Exam: 


General appearance: Alert and Oriented


Head: Normocephalic, without obvious abnormality


Eyes: conjunctivae/corneas clear. PERRL, EOM's intact. Fundi benign


Back: no CVA pain on testing bilaterally 


Lungs: Regular respirations, non labored breathing 


Abdomen: soft, non-tender.  No masses,  no organomegaly


Vital Signs





Vital Signs








  Date Time  Temp Pulse Resp B/P (MAP) Pulse Ox O2 Delivery O2 Flow Rate FiO2


 


8/5/19 07:00 98.5 55 16 116/67 (83) 99 Room Air  





 98.5       


 


8/5/19 03:18 98.5 89 18 100/56 (71) 99 Room Air  





 98.5       


 


8/4/19 23:00 98.3 84 18 112/59 (76) 96 Room Air  





 98.3       


 


8/4/19 20:00      Room Air  


 


8/4/19 19:00 98.7 94 20 106/56 (73) 96 Room Air  





 98.7       


 


8/4/19 16:00  90  104/60 (75)    


 


8/4/19 15:30  71  105/64 (78)    


 


8/4/19 15:15  72  116/67 (83)    


 


8/4/19 15:00  75  115/67 (83)    


 


8/4/19 14:45  69  106/75 (85)    


 


8/4/19 14:30 97.9 71 16 112/64 (80) 98 Room Air  





 97.9       


 


8/4/19 14:18 98.1 62 12 101/60 96 Room Air  





 98.1       


 


8/4/19 14:00 98.1 69 17 105/59 96 Room Air  





 98.1       


 


8/4/19 13:45 98.1 70 18 96/52 97 Room Air  





 98.1       


 


8/4/19 13:32 98.1 69 18 96/51 98 Simple Mask 8 





 98.1       


 


8/4/19 13:32      Mask 8 


 


8/4/19 12:34 97.8 58 21 99/60 97 Room Air  





 97.8       


 


8/4/19 09:39      Room Air  








I & O











Intake and Output 


 


 8/5/19





 06:59


 


Intake Total 240 ml


 


Output Total 2950 ml


 


Balance -2710 ml


 


 


 


Intake Oral 240 ml


 


Output Urine Total 2950 ml


 


# Voids 1











PHYSICAL EXAM


Physical Exam


Physical Exam: 


General appearance: Alert and Oriented


Head: Normocephalic, without obvious abnormality


Eyes: conjunctivae/corneas clear. PERRL, EOM's intact. Fundi benign


Back: no CVA pain on testing bilaterally 


Lungs: Regular respirations, non labored breathing 


Abdomen: soft, non-tender.  No masses,  no organomegaly





ASSESSMENT/PLAN


Assessment/Plan





Discussed with patient that it is OK to have red tinged urine with small clots 

as long as it resembles red Koolade or Gatorade  Please call for very dark ( 

wine colored) urine or urine with large clots. Please also call for fever grea

ter than 100.


Oly(surgery scheduler for [facility]) will call patient to schedule follow up E[facility] 

surgery within the next 2-4 days.  Please call [facility] and ask for Oly if you 

receive no communication by this time. 


Follow up with [facility] for malaria concerns-pt was afebrile while

in house, so we do not have acute concerns for this. 


All questions answered.


Ok for patient to D/C home, D/C with pain medication. 


Dr. Simmons will do discharge.


Problems:  


(1) Left ureteral stone


(2) Urinary tract infection


(3) Hydronephrosis with renal and ureteral calculous obstruction





COMMENT


Lab





Laboratory Tests








Test


 8/5/19


05:40


 


White Blood Count


 6.7 x10^3/uL


(4.0-11.0)


 


Red Blood Count


 3.89 x10^6/uL


(3.50-5.40)


 


Hemoglobin


 11.7 g/dL


(12.0-15.5)


 


Hematocrit


 34.4 %


(36.0-47.0)


 


Mean Corpuscular Volume 88 fL () 


 


Mean Corpuscular Hemoglobin 30 pg (25-35) 


 


Mean Corpuscular Hemoglobin


Concent 34 g/dL


(31-37)


 


Red Cell Distribution Width


 13.2 %


(11.5-14.5)


 


Platelet Count


 160 x10^3/uL


(140-400)


 


Neutrophils (%) (Auto) 81 % (31-73) 


 


Lymphocytes (%) (Auto) 14 % (24-48) 


 


Monocytes (%) (Auto) 6 % (0-9) 


 


Eosinophils (%) (Auto) 0 % (0-3) 


 


Basophils (%) (Auto) 0 % (0-3) 


 


Neutrophils # (Auto)


 5.4 x10^3/uL


(1.8-7.7)


 


Lymphocytes # (Auto)


 0.9 x10^3/uL


(1.0-4.8)


 


Monocytes # (Auto)


 0.4 x10^3/uL


(0.0-1.1)


 


Eosinophils # (Auto)


 0.0 x10^3/uL


(0.0-0.7)


 


Basophils # (Auto)


 0.0 x10^3/uL


(0.0-0.2)


 


Sodium Level


 142 mmol/L


(136-145)


 


Potassium Level


 4.3 mmol/L


(3.5-5.1)


 


Chloride Level


 109 mmol/L


()


 


Carbon Dioxide Level


 20 mmol/L


(21-32)


 


Anion Gap 13 (6-14) 


 


Blood Urea Nitrogen 9 mg/dL (7-20) 


 


Creatinine


 1.0 mg/dL


(0.6-1.0)


 


Estimated GFR


(Cockcroft-Gault) 60.2 





 


BUN/Creatinine Ratio 9 (6-20) 


 


Glucose Level


 102 mg/dL


(70-99)


 


Calcium Level


 8.2 mg/dL


(8.5-10.1)


 


Total Bilirubin


 0.4 mg/dL


(0.2-1.0)


 


Aspartate Amino Transf


(AST/SGOT) 13 U/L (15-37) 





 


Alanine Aminotransferase


(ALT/SGPT) 18 U/L (14-59) 





 


Alkaline Phosphatase


 74 U/L


()


 


Total Protein


 5.8 g/dL


(6.4-8.2)


 


Albumin


 2.6 g/dL


(3.4-5.0)


 


Albumin/Globulin Ratio 0.8 (1.0-1.7) 











MARIA INES WALKER MD 8/7/19 1308:


ASSESSMENT/PLAN


Assessment/Plan


Agree with assessment and plan.











MYNOR MALLORY                Aug 5, 2019 09:34


MARIA INES WALKER MD                Aug 7, 2019 13:08

## 2019-08-05 NOTE — NUR
Discharge Note:



MATT LOPEZ



Discharge instructions and discharge home medications reviewed with Patient and a copy 
given. All questions have been answered and understanding verbalized. 



The following instructions and handouts were given: Kidney stones 



Discontinued lines and drains: Peripheral IV intact.



Patient discharged to Home or Self Care with Family Member via Ambulated

## 2019-08-05 NOTE — DS
DATE OF DISCHARGE:  08/05/2019



HOSPITAL COURSE:  The patient is a 43-year-old  female patient who was

seen at the Emergency Room of [facility], was found to have a 6 mm

obstructing left ureteral stone with hydronephrosis.  She was transferred to

[facility], was seen by Dr. Yoel Delgado, the urologist and she

underwent cystoscopy and left ureteral stent placement and left retrograde

pyelography.  Her urine was cloudy and therefore, it was sent for culture and

sensitivity and was started empirically on Bactrim twice a day for 7 days.  When

I saw her this morning, she was sitting comfortably, eating her breakfast, in no

apparent distress.  On questioning her, denied any abdominal pain.  Denied any

chills, rigors, or fever.  Denied nausea or vomiting.



OBJECTIVE:

VITAL SIGNS:  Her heart rate was 89, blood pressure 100/56, temperature was

98.5, respiratory rate was 18 and oxygen saturation was 99%.

The rest of clinical examination is stable, has not really changed.



LABORATORY DATA:  Labs as of this morning showed a serum sodium 142, potassium

4.3, chloride 109, bicarbonate 20, anion gap of 13, BUN 9, creatinine 1,

estimated GFR was 60 mL per minute.  Her glucose was 102, calcium was 8.2. 

Total bilirubin, AST, ALT, alkaline phosphatase were normal.  Total protein was

5.8, albumin 2.6.  Her white cell count was 6700, hemoglobin 12, hematocrit 34,

MCV 88 and platelet count of 160,000.



DISCHARGE MEDICATIONS:  The patient was discharged home to continue on

Bactrim-DS 1 tablet twice a day for 7 days.  She was also given a prescription

for tramadol 50 mg every 4 hours.



ALLERGIES:  SHE IS ALLERGIC TO HYDROCODONE.



FINAL DISCHARGE DIAGNOSES:

1.  Left ureteral stone with hydronephrosis.

2.  Urinary tract infection.

 



______________________________

ISMA PETERS MD



DR:  DELMER/tori  JOB#:  [id number] / [id number]

DD:  08/05/2019 08:28  DT:  08/05/2019 08:42

## 2019-08-21 ENCOUNTER — HOSPITAL ENCOUNTER (OUTPATIENT)
Dept: HOSPITAL 63 - DXRAD | Age: 44
Discharge: HOME | End: 2019-08-21
Attending: UROLOGY
Payer: COMMERCIAL

## 2019-08-21 DIAGNOSIS — Z96.0: ICD-10-CM

## 2019-08-21 DIAGNOSIS — N20.1: Primary | ICD-10-CM

## 2019-08-21 PROCEDURE — 74018 RADEX ABDOMEN 1 VIEW: CPT

## 2019-08-22 NOTE — RAD
EXAM: ABDOMEN ONE VIEW.

 

HISTORY: Left ureteral stent placement. Renal stone.

 

COMPARISON: 08/04/2019.

 

FINDINGS: A frontal view of the abdomen is obtained. A left ureteral stent

is in place. A 3 mm proximal ureteral calculus is vaguely detectable 

adjacent to the stent just proximal to the L3 transverse process. No 

additional renal calculi are seen bilaterally. Throughout the right colon 

suggests mild constipation. There are no distended small bowel loops. 

There is gas distally.

 

IMPRESSION:

1. Suspect a 3 mm left proximal ureteral calculus. Left ureteral stent in 

expected position.

2. Correlate for mild constipation.

 

Electronically signed by: ANNELISE Connelly MD (8/22/2019 12:20 AM) 

[facility]